# Patient Record
Sex: FEMALE | Race: WHITE
[De-identification: names, ages, dates, MRNs, and addresses within clinical notes are randomized per-mention and may not be internally consistent; named-entity substitution may affect disease eponyms.]

---

## 2020-10-29 ENCOUNTER — HOSPITAL ENCOUNTER (OUTPATIENT)
Dept: HOSPITAL 88 - OR | Age: 30
Discharge: HOME | End: 2020-10-29
Attending: OTOLARYNGOLOGY
Payer: MEDICARE

## 2020-10-29 VITALS — DIASTOLIC BLOOD PRESSURE: 88 MMHG | SYSTOLIC BLOOD PRESSURE: 138 MMHG

## 2020-10-29 DIAGNOSIS — E66.01: ICD-10-CM

## 2020-10-29 DIAGNOSIS — Z79.84: ICD-10-CM

## 2020-10-29 DIAGNOSIS — F41.9: ICD-10-CM

## 2020-10-29 DIAGNOSIS — J32.0: Primary | ICD-10-CM

## 2020-10-29 DIAGNOSIS — G47.33: ICD-10-CM

## 2020-10-29 DIAGNOSIS — E03.9: ICD-10-CM

## 2020-10-29 DIAGNOSIS — D64.9: ICD-10-CM

## 2020-10-29 DIAGNOSIS — J45.909: ICD-10-CM

## 2020-10-29 DIAGNOSIS — K58.9: ICD-10-CM

## 2020-10-29 DIAGNOSIS — K21.9: ICD-10-CM

## 2020-10-29 DIAGNOSIS — G40.802: ICD-10-CM

## 2020-10-29 DIAGNOSIS — E28.2: ICD-10-CM

## 2020-10-29 DIAGNOSIS — F32.9: ICD-10-CM

## 2020-10-29 DIAGNOSIS — Z11.59: ICD-10-CM

## 2020-10-29 DIAGNOSIS — Z01.812: ICD-10-CM

## 2020-10-29 DIAGNOSIS — Z79.02: ICD-10-CM

## 2020-10-29 DIAGNOSIS — N80.9: ICD-10-CM

## 2020-10-29 DIAGNOSIS — J34.2: ICD-10-CM

## 2020-10-29 DIAGNOSIS — J34.1: ICD-10-CM

## 2020-10-29 PROCEDURE — 81025 URINE PREGNANCY TEST: CPT

## 2020-10-29 PROCEDURE — 88305 TISSUE EXAM BY PATHOLOGIST: CPT

## 2020-10-29 PROCEDURE — 31267 ENDOSCOPY MAXILLARY SINUS: CPT

## 2020-10-29 PROCEDURE — 88304 TISSUE EXAM BY PATHOLOGIST: CPT

## 2020-10-29 NOTE — OPERATIVE REPORT
DATE OF PROCEDURE:  10/29/2020

 

SURGEON:  Zaki Peralta MD

 

PREOPERATIVE DIAGNOSIS:  Right maxillary sinus mass.

 

POSTOPERATIVE DIAGNOSIS:  Right maxillary sinus mucous retention cyst.

 

PROCEDURE:  Functional endoscopic sinus surgery (right maxillary sinus 
antrostomy with

removal of mucous retention cyst). 

 

SIGNIFICANT FINDINGS:  Right maxillary sinus mucous retention cyst.

 

ANESTHESIA:  General endotracheal tube anesthesia.

 

SPECIMENS REMOVED:  Right maxillary sinus contents.

 

ESTIMATED BLOOD LOSS:  Less than 5 mL.

 

COMPLICATIONS:  None.

 

INDICATIONS:  The patient is a 29-year-old white female with nasal congestion 
and facial

pain, worse on the left side.  She denies rhinorrhea.  She has had no previous 
sinus or

nasal surgery.  She is a nonsmoker.  Nasal endoscopy revealed mild diffuse 
mucosal

inflammation without overt purulence.  CT of the sinus performed on September 22, 2020,

revealed complete opacification of the right maxillary sinus and a mucous 
retention cyst

in the left maxillary sinus with a deviated septum to the left.  On examination,
she has

mild diffuse intranasal mucosal inflammation.  The septum is deviated to the 
left.  She

is scheduled for functional endoscopic sinus surgery (right maxillary antrostomy
with

removal of right maxillary sinus mass) for the treatment of right maxillary 
sinus

opacification.  Risks and complications were thoroughly discussed with the 
patient and

her mother and they include infection, bleeding, scarring, failure to improve, 
need for

additional operations, inability to smell or taste, chronic pain, leakage of

cerebrospinal fluid ("brain fluid"), visual changes including double vision and

blindness, exsanguination requiring blood transfusion (though the patient 
refuses blood

transfusion), damage to surrounding nerves, blood vessels and muscles.  They 
fully

understand and give consent. 

 

DESCRIPTION OF PROCEDURE:  The patient was taken to the operating room and 
placed supine

on the operating table, where general anesthesia was achieved through 
orotracheal

intubation.  Eyes were taped.  Cottonoid pledgets soaked with Afrin were 
inserted into

the nose bilaterally.  The face was prepped and draped in the usual sterile 
fashion.

The cottonoid pledgets were then removed.  Thorough examination with a rigid 0-
degree

nasal endoscope revealed the septum to be deviated to the left.  There was no 
evidence

of masses, purulence, or polyps.  The left side appeared clear and was left

undisturbed.  Attention was then directed to the right side, where injection 
with 1%

lidocaine with 1:100,000 epinephrine was injected into the junction of the right
middle

turbinate and the lateral nasal wall as well as the uncinate process and the 
anterior middle turbinate.  Cottonoid pledget soaked with Afrin

was inserted into the middle meatus and was then removed.  The uncinate process 
was

then taken down with a backbiter and up-biting Nilesh-Cut instruments.  The natural
ostium of the right maxillary sinus was then identified and was enlarged to 1.5 
cm

in diameter with a backbiter, Nilesh-Cut instruments and a microdebrider.  A cyst 
was

present, which released serous fluid and was consistent with a mucus retention 
cyst.

This was then debrided with a microdebrider including its attachment inferiorly 
at the 

floor of the right maxillary sinus.  Otherwise, the right maxillary sinus 
appeared

clear with no evidence of significant inflammation, allergic mucin, purulence or
masses.

 The cystic mass was consistent with a mucous retention cyst.  Following this, 
the

patient was awakened in the operating room, extubated, and taken to the recovery
room in

good condition. 

 

 

 

 

______________________________

MD LUISITO Rothman/ANDRAE

D:  10/29/2020 08:00:32

T:  10/29/2020 08:36:39

Job #:  [id number]

 

HEATHER